# Patient Record
Sex: FEMALE | Race: WHITE
[De-identification: names, ages, dates, MRNs, and addresses within clinical notes are randomized per-mention and may not be internally consistent; named-entity substitution may affect disease eponyms.]

---

## 2019-08-23 ENCOUNTER — HOSPITAL ENCOUNTER (EMERGENCY)
Dept: HOSPITAL 47 - EC | Age: 24
Discharge: HOME | End: 2019-08-23
Payer: COMMERCIAL

## 2019-08-23 VITALS
RESPIRATION RATE: 18 BRPM | SYSTOLIC BLOOD PRESSURE: 117 MMHG | HEART RATE: 75 BPM | DIASTOLIC BLOOD PRESSURE: 73 MMHG | TEMPERATURE: 97.9 F

## 2019-08-23 DIAGNOSIS — R74.0: ICD-10-CM

## 2019-08-23 DIAGNOSIS — B34.9: Primary | ICD-10-CM

## 2019-08-23 LAB
ALBUMIN SERPL-MCNC: 5.4 G/DL (ref 3.5–5)
ALP SERPL-CCNC: 50 U/L (ref 38–126)
ALT SERPL-CCNC: 22 U/L (ref 9–52)
ANION GAP SERPL CALC-SCNC: 11 MMOL/L
AST SERPL-CCNC: 61 U/L (ref 14–36)
BASOPHILS # BLD AUTO: 0.1 K/UL (ref 0–0.2)
BASOPHILS NFR BLD AUTO: 1 %
BUN SERPL-SCNC: 10 MG/DL (ref 7–17)
CALCIUM SPEC-MCNC: 9.8 MG/DL (ref 8.4–10.2)
CHLORIDE SERPL-SCNC: 101 MMOL/L (ref 98–107)
CO2 SERPL-SCNC: 25 MMOL/L (ref 22–30)
EOSINOPHIL # BLD AUTO: 0.3 K/UL (ref 0–0.7)
EOSINOPHIL NFR BLD AUTO: 3 %
ERYTHROCYTE [DISTWIDTH] IN BLOOD BY AUTOMATED COUNT: 5.49 M/UL (ref 3.8–5.4)
ERYTHROCYTE [DISTWIDTH] IN BLOOD: 13.8 % (ref 11.5–15.5)
GLUCOSE SERPL-MCNC: 82 MG/DL (ref 74–99)
HCT VFR BLD AUTO: 50 % (ref 34–46)
HGB BLD-MCNC: 16.2 GM/DL (ref 11.4–16)
LYMPHOCYTES # SPEC AUTO: 1.5 K/UL (ref 1–4.8)
LYMPHOCYTES NFR SPEC AUTO: 12 %
MCH RBC QN AUTO: 29.6 PG (ref 25–35)
MCHC RBC AUTO-ENTMCNC: 32.5 G/DL (ref 31–37)
MCV RBC AUTO: 91.1 FL (ref 80–100)
MONOCYTES # BLD AUTO: 0.4 K/UL (ref 0–1)
MONOCYTES NFR BLD AUTO: 3 %
NEUTROPHILS # BLD AUTO: 10.5 K/UL (ref 1.3–7.7)
NEUTROPHILS NFR BLD AUTO: 81 %
PLATELET # BLD AUTO: 243 K/UL (ref 150–450)
PROT SERPL-MCNC: 9.2 G/DL (ref 6.3–8.2)
SODIUM SERPL-SCNC: 137 MMOL/L (ref 137–145)
WBC # BLD AUTO: 12.9 K/UL (ref 3.8–10.6)

## 2019-08-23 PROCEDURE — 85025 COMPLETE CBC W/AUTO DIFF WBC: CPT

## 2019-08-23 PROCEDURE — 99283 EMERGENCY DEPT VISIT LOW MDM: CPT

## 2019-08-23 PROCEDURE — 86308 HETEROPHILE ANTIBODY SCREEN: CPT

## 2019-08-23 PROCEDURE — 36415 COLL VENOUS BLD VENIPUNCTURE: CPT

## 2019-08-23 PROCEDURE — 87430 STREP A AG IA: CPT

## 2019-08-23 PROCEDURE — 87081 CULTURE SCREEN ONLY: CPT

## 2019-08-23 PROCEDURE — 96361 HYDRATE IV INFUSION ADD-ON: CPT

## 2019-08-23 PROCEDURE — 80053 COMPREHEN METABOLIC PANEL: CPT

## 2019-08-23 PROCEDURE — 96374 THER/PROPH/DIAG INJ IV PUSH: CPT

## 2019-08-23 NOTE — ED
ENT HPI





- General


Chief complaint: ENT


Stated complaint: Joint Pain


Source: patient


Mode of arrival: ambulatory


Limitations: no limitations





- History of Present Illness


Initial comments: 


Patient is a previously healthy 25yo who presents to the ER today for evaluation

of generalized body aches, sore throat and malaise. Patient reports that 

yesterday during the day she began having body aches, she reports generalized 

aching of her entire body. This evening she reports again feeling febrile, her 

throat was sore she looked in the mirror noted that her tonsils seem to be 

enlarged she also felt that her neck seemed stiff.  





Review of Systems


ROS Statement: 


Those systems with pertinent positive or pertinent negative responses have been 

documented in the HPI.





ROS Other: All systems not noted in ROS Statement are negative.





Past Medical History


Additional Past Medical History / Comment(s): HEart murmur


Past Surgical History: No Surgical Hx Reported


Past Psychological History: Depression


Smoking Status: Never smoker


Past Alcohol Use History: None Reported


Past Drug Use History: None Reported





General Exam





- General Exam Comments


Initial Comments: 


Physical Exam


GENERAL:


Patient is well-developed and well-nourished.  


Appears mildly dehydrated





HENT:


Normocephalic, Atraumatic. 


TMs normal bilaterally


Tonsillar hypertrophy, some white spots on the posterior oropharynx


Full range of motion of the neck, no nuchal rigidity no meningeal signs





EYES:


PERRL, EOMI





PULMONARY:


Unlabored respirations.  No audible rales rhonchi or wheezing was noted.





CARDIOVASCULAR:


There is a regular rate and rhythm without any murmurs gallops or rubs.  





ABDOMEN:


Soft and nontender with normal bowel sounds. 





SKIN:


Skin is clear with no lesions or rashes and otherwise unremarkable.





: 


Deferred





NEUROLOGIC:


Patient is alert and oriented x3.  Moving all extremities spontaneously





MUSCULOSKELETAL:


Normal extremities with adequate strength and full range of motion.  No lower 

extremity swelling or edema.  No calf tenderness.  





PSYCHIATRIC:


Normal psychiatric evaluation.





Limitations: no limitations





Course


                                   Vital Signs











  08/23/19 08/23/19





  02:21 04:10


 


Temperature 98.5 F 97.9 F


 


Pulse Rate 80 75


 


Respiratory 16 18





Rate  


 


Blood Pressure 130/86 117/73


 


O2 Sat by Pulse 98 98





Oximetry  














Medical Decision Making





- Medical Decision Making


The patient was seen and evaluated history is obtained from patient


History and physical exam are concerning for viral syndrome, IV fluids and 

Toradol were ordered


Strep swab was obtained





An heterophile were negative however do have a high suspicion the patient may 

have mono.  I discussed with the patient the possibility that she may have mono 

and that the heterophile test would not be positive for the first week or 2 

after exposure.  Patient's breast understanding of this.  Importance of cristina

pportive care was discussed.  Patient reported complete resolution of her body 

aches after IV fluids and Toradol and is comfortable with plan for discharge 

home.  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 

immunocompromised patients and would like to day work note to avoid exposing her

patient's to any illness.  No was provided.  All questions pertaining care were 

answered return parameters were discussed patient was discharged home in stable 

condition.








- Lab Data


Result diagrams: 


                                 08/23/19 03:26





                                 08/23/19 03:26


                                   Lab Results











  08/23/19 08/23/19 08/23/19 Range/Units





  03:26 03:26 03:26 


 


WBC  12.9 H    (3.8-10.6)  k/uL


 


RBC  5.49 H    (3.80-5.40)  m/uL


 


Hgb  16.2 H    (11.4-16.0)  gm/dL


 


Hct  50.0 H    (34.0-46.0)  %


 


MCV  91.1    (80.0-100.0)  fL


 


MCH  29.6    (25.0-35.0)  pg


 


MCHC  32.5    (31.0-37.0)  g/dL


 


RDW  13.8    (11.5-15.5)  %


 


Plt Count  243    (150-450)  k/uL


 


Neutrophils %  81    %


 


Lymphocytes %  12    %


 


Monocytes %  3    %


 


Eosinophils %  3    %


 


Basophils %  1    %


 


Neutrophils #  10.5 H    (1.3-7.7)  k/uL


 


Lymphocytes #  1.5    (1.0-4.8)  k/uL


 


Monocytes #  0.4    (0-1.0)  k/uL


 


Eosinophils #  0.3    (0-0.7)  k/uL


 


Basophils #  0.1    (0-0.2)  k/uL


 


Sodium   137   (137-145)  mmol/L


 


Potassium      (3.5-5.1)  mmol/L


 


Chloride   101   ()  mmol/L


 


Carbon Dioxide   25   (22-30)  mmol/L


 


Anion Gap   11   mmol/L


 


BUN   10   (7-17)  mg/dL


 


Creatinine   0.70   (0.52-1.04)  mg/dL


 


Est GFR (CKD-EPI)AfAm   >90   (>60 ml/min/1.73 sqM)  


 


Est GFR (CKD-EPI)NonAf   >90   (>60 ml/min/1.73 sqM)  


 


Glucose   82   (74-99)  mg/dL


 


Calcium   9.8   (8.4-10.2)  mg/dL


 


Total Bilirubin   2.0 H   (0.2-1.3)  mg/dL


 


AST   61 H   (14-36)  U/L


 


ALT   22   (9-52)  U/L


 


Alkaline Phosphatase   50   ()  U/L


 


Total Protein   9.2 H   (6.3-8.2)  g/dL


 


Albumin   5.4 H   (3.5-5.0)  g/dL


 


Heterophile Antibody    Negative  (Negative)  


 


Group A Strep Rapid     (Negative)  














  08/23/19 Range/Units





  03:26 


 


WBC   (3.8-10.6)  k/uL


 


RBC   (3.80-5.40)  m/uL


 


Hgb   (11.4-16.0)  gm/dL


 


Hct   (34.0-46.0)  %


 


MCV   (80.0-100.0)  fL


 


MCH   (25.0-35.0)  pg


 


MCHC   (31.0-37.0)  g/dL


 


RDW   (11.5-15.5)  %


 


Plt Count   (150-450)  k/uL


 


Neutrophils %   %


 


Lymphocytes %   %


 


Monocytes %   %


 


Eosinophils %   %


 


Basophils %   %


 


Neutrophils #   (1.3-7.7)  k/uL


 


Lymphocytes #   (1.0-4.8)  k/uL


 


Monocytes #   (0-1.0)  k/uL


 


Eosinophils #   (0-0.7)  k/uL


 


Basophils #   (0-0.2)  k/uL


 


Sodium   (137-145)  mmol/L


 


Potassium   (3.5-5.1)  mmol/L


 


Chloride   ()  mmol/L


 


Carbon Dioxide   (22-30)  mmol/L


 


Anion Gap   mmol/L


 


BUN   (7-17)  mg/dL


 


Creatinine   (0.52-1.04)  mg/dL


 


Est GFR (CKD-EPI)AfAm   (>60 ml/min/1.73 sqM)  


 


Est GFR (CKD-EPI)NonAf   (>60 ml/min/1.73 sqM)  


 


Glucose   (74-99)  mg/dL


 


Calcium   (8.4-10.2)  mg/dL


 


Total Bilirubin   (0.2-1.3)  mg/dL


 


AST   (14-36)  U/L


 


ALT   (9-52)  U/L


 


Alkaline Phosphatase   ()  U/L


 


Total Protein   (6.3-8.2)  g/dL


 


Albumin   (3.5-5.0)  g/dL


 


Heterophile Antibody   (Negative)  


 


Group A Strep Rapid  Negative  (Negative)  














Disposition


Clinical Impression: 


 Viral syndrome, Elevated transaminase level, Leukocytosis





Disposition: HOME SELF-CARE


Condition: Stable


Instructions (If sedation given, give patient instructions):  Mononucleosis (ED)


Is patient prescribed a controlled substance at d/c from ED?: No


Referrals: 


Felicita Parmar MD [Primary Care Provider] - 1-2 days

## 2022-04-18 ENCOUNTER — HOSPITAL ENCOUNTER (OUTPATIENT)
Dept: HOSPITAL 47 - ORWHC2ENDO | Age: 27
Discharge: HOME | End: 2022-04-18
Attending: SURGERY
Payer: COMMERCIAL

## 2022-04-18 VITALS — RESPIRATION RATE: 16 BRPM | DIASTOLIC BLOOD PRESSURE: 76 MMHG | SYSTOLIC BLOOD PRESSURE: 117 MMHG | HEART RATE: 61 BPM

## 2022-04-18 VITALS — TEMPERATURE: 98.1 F

## 2022-04-18 VITALS — BODY MASS INDEX: 21.6 KG/M2

## 2022-04-18 DIAGNOSIS — K62.5: Primary | ICD-10-CM

## 2022-04-18 DIAGNOSIS — Z79.3: ICD-10-CM

## 2022-04-18 DIAGNOSIS — E78.5: ICD-10-CM

## 2022-04-18 DIAGNOSIS — Z79.899: ICD-10-CM

## 2022-04-18 DIAGNOSIS — Z98.890: ICD-10-CM

## 2022-04-18 DIAGNOSIS — F32.A: ICD-10-CM

## 2022-04-18 PROCEDURE — 45378 DIAGNOSTIC COLONOSCOPY: CPT

## 2022-04-18 PROCEDURE — 81025 URINE PREGNANCY TEST: CPT

## 2022-04-18 NOTE — P.OP
Date of Procedure: 04/18/22


Preoperative Diagnosis: 


GI bleed


Postoperative Diagnosis: 


normal colon


Procedure(s) Performed: 


colonoscopy


Anesthesia: MAC


Surgeon: Nic Medina


Pathology: none sent


Condition: stable


Disposition: PACU


Description of Procedure: 


the patient's placed on the endoscopy table in the lateral position.  She r

eceived IV sedation.  Digital rectal exam was performed.  This revealed no 

abnormalities.  The flexible colonoscope was then placed patient anus passed 

throughout the entire colon.  The ileocecal valve was vnot visualized secondary 

to tortuosity valve.  Several times made to maneuver the scope into the cecum 

however this impossible.  Scope withdrawn.  The ascending colon, transverse 

colon and descending colon and sigmoid colon appeared normal.  Scope was brought

back the rectum and this was normal.  Scope withdrawn for patient.

## 2022-04-18 NOTE — P.GSHP
History of Present Illness


H&P Date: 04/18/22


Chief Complaint: GI bleed





Gthis a 26-year-old female who presents today for colonoscopy.  She's had issues

with intermittent rectal bleeding and anal pain.





Past Medical History


Past Medical History: Hyperlipidemia, Skin Disorder


Additional Past Medical History / Comment(s): Diarrhea, blood on toilet tissue 

and pain with bowel movements on and off X2-3 yrs. Heart murmur. Hyperlipidemia 

resolved. "Get hives easily".


History of Any Multi-Drug Resistant Organisms: None Reported


Past Surgical History: No Surgical Hx Reported


Additional Past Surgical History / Comment(s): [place] teeth, ingrown toenail 

surgery multiple times.


Past Anesthesia/Blood Transfusion Reactions: Previous Problems w/ Anesthesia


Additional Past Anesthesia/Blood Transfusion Reaction / Comment(s): Mom "needs 

more than standard dose".


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


Smoking Status: Never smoker


Past Alcohol Use History: Occasional


Past Drug Use History: Marijuana


Additional Drug Use History / Comment(s): Rare Marijuana use, none in 6 months.





- Past Family History


  ** Mother


Family Medical History: No Reported History





Medications and Allergies


                                Home Medications











 Medication  Instructions  Recorded  Confirmed  Type


 


Biotin 10,000 mcg PO DAILY 04/14/22 04/14/22 History


 


Birth Control Pill 1 tab PO DAILY 04/14/22 04/18/22 History


 


Cetirizine HCl [Zyrtec] 10 mg PO DAILY 04/14/22 04/18/22 History


 


Multivit with Calcium,Iron,Min 1 each PO DAILY 04/14/22 04/14/22 History





[Women's Multivitamin]    


 


Sertraline HCl [Zoloft] 75 mg PO DAILY 04/14/22 04/18/22 History








                                    Allergies











Allergy/AdvReac Type Severity Reaction Status Date / Time


 


No Known Allergies Allergy   Verified 04/14/22 10:48














Surgical - Exam


                                   Vital Signs











Temp Pulse Resp BP Pulse Ox


 


 98.1 F   67   16   132/82   99 


 


 04/18/22 12:34  04/18/22 12:34  04/18/22 12:34  04/18/22 12:34  04/18/22 12:34














- General


well developed, well nourished, no distress





- Eyes


PERRL





- ENT


normal pinna





- Neck


no masses





- Respiratory


normal expansion





- Cardiovascular


Rhythm: regular





- Abdomen


Abdomen: soft, non tender





Assessment and Plan


Assessment: 





history of GI bleed.  We'll perform colonoscopy

## 2022-08-10 ENCOUNTER — HOSPITAL ENCOUNTER (EMERGENCY)
Dept: HOSPITAL 47 - EC | Age: 27
Discharge: HOME | End: 2022-08-10
Payer: COMMERCIAL

## 2022-08-10 VITALS
RESPIRATION RATE: 18 BRPM | TEMPERATURE: 98.7 F | SYSTOLIC BLOOD PRESSURE: 122 MMHG | HEART RATE: 88 BPM | DIASTOLIC BLOOD PRESSURE: 70 MMHG

## 2022-08-10 DIAGNOSIS — E78.5: ICD-10-CM

## 2022-08-10 DIAGNOSIS — R19.7: Primary | ICD-10-CM

## 2022-08-10 LAB
ALBUMIN SERPL-MCNC: 4.3 G/DL (ref 3.5–5)
ALP SERPL-CCNC: 49 U/L (ref 38–126)
ALT SERPL-CCNC: 19 U/L (ref 4–34)
ANION GAP SERPL CALC-SCNC: 13 MMOL/L
AST SERPL-CCNC: 29 U/L (ref 14–36)
BASOPHILS # BLD AUTO: 0 K/UL (ref 0–0.2)
BASOPHILS NFR BLD AUTO: 0 %
BUN SERPL-SCNC: 9 MG/DL (ref 7–17)
CALCIUM SPEC-MCNC: 9.6 MG/DL (ref 8.4–10.2)
CHLORIDE SERPL-SCNC: 101 MMOL/L (ref 98–107)
CO2 SERPL-SCNC: 23 MMOL/L (ref 22–30)
EOSINOPHIL # BLD AUTO: 0.1 K/UL (ref 0–0.7)
EOSINOPHIL NFR BLD AUTO: 1 %
ERYTHROCYTE [DISTWIDTH] IN BLOOD BY AUTOMATED COUNT: 4.87 M/UL (ref 3.8–5.4)
ERYTHROCYTE [DISTWIDTH] IN BLOOD: 12.7 % (ref 11.5–15.5)
GLUCOSE SERPL-MCNC: 75 MG/DL (ref 74–99)
HCT VFR BLD AUTO: 44.8 % (ref 34–46)
HGB BLD-MCNC: 14.6 GM/DL (ref 11.4–16)
LIPASE SERPL-CCNC: 69 U/L (ref 23–300)
LYMPHOCYTES # SPEC AUTO: 1.7 K/UL (ref 1–4.8)
LYMPHOCYTES NFR SPEC AUTO: 23 %
MAGNESIUM SPEC-SCNC: 1.8 MG/DL (ref 1.6–2.3)
MCH RBC QN AUTO: 29.9 PG (ref 25–35)
MCHC RBC AUTO-ENTMCNC: 32.5 G/DL (ref 31–37)
MCV RBC AUTO: 91.8 FL (ref 80–100)
MONOCYTES # BLD AUTO: 0.4 K/UL (ref 0–1)
MONOCYTES NFR BLD AUTO: 5 %
NEUTROPHILS # BLD AUTO: 5.1 K/UL (ref 1.3–7.7)
NEUTROPHILS NFR BLD AUTO: 68 %
PLATELET # BLD AUTO: 331 K/UL (ref 150–450)
POTASSIUM SERPL-SCNC: 4.1 MMOL/L (ref 3.5–5.1)
PROT SERPL-MCNC: 7 G/DL (ref 6.3–8.2)
SODIUM SERPL-SCNC: 137 MMOL/L (ref 137–145)
WBC # BLD AUTO: 7.6 K/UL (ref 3.8–10.6)

## 2022-08-10 PROCEDURE — 83630 LACTOFERRIN FECAL (QUAL): CPT

## 2022-08-10 PROCEDURE — 87046 STOOL CULTR AEROBIC BACT EA: CPT

## 2022-08-10 PROCEDURE — 36415 COLL VENOUS BLD VENIPUNCTURE: CPT

## 2022-08-10 PROCEDURE — 83690 ASSAY OF LIPASE: CPT

## 2022-08-10 PROCEDURE — 99284 EMERGENCY DEPT VISIT MOD MDM: CPT

## 2022-08-10 PROCEDURE — 83735 ASSAY OF MAGNESIUM: CPT

## 2022-08-10 PROCEDURE — 85025 COMPLETE CBC W/AUTO DIFF WBC: CPT

## 2022-08-10 PROCEDURE — 96360 HYDRATION IV INFUSION INIT: CPT

## 2022-08-10 PROCEDURE — 96361 HYDRATE IV INFUSION ADD-ON: CPT

## 2022-08-10 PROCEDURE — 87045 FECES CULTURE AEROBIC BACT: CPT

## 2022-08-10 PROCEDURE — 80053 COMPREHEN METABOLIC PANEL: CPT

## 2022-08-28 NOTE — ED
Nausea/Vomiting/Diarrhea HPI





- General


Chief complaint: Nausea/Vomiting/Diarrhea


Stated complaint: diarrhea, recently out of country


Time Seen by Provider: 08/10/22 10:42


Source: patient


Mode of arrival: ambulatory


Limitations: no limitations





- History of Present Illness


Initial comments: 


Patient is a 27-year-old otherwise healthy female who presents to the emergency 

department with a chief complaint of diarrhea.  Patient states she was in 

[facility] for mission trip and returned when she started to experience diarrhea.

 Patient reports 3-5 episodes of diarrhea for the past 3 days.  Describes it as 

loose and watery.  Denies blood in the stool.  Has been taking loperamide at 

home with some relief.  Reports a BRAT diet and drinking Pedialyte..  Denies 

fever, chills, nausea, vomiting, abdominal pain.  Denies recent antibiotic use. 

Patient states during her trip she only drank purified water.








- Related Data


                                Home Medications











 Medication  Instructions  Recorded  Confirmed


 


Biotin 10,000 mcg PO DAILY 04/14/22 08/10/22


 


Cetirizine HCl [Zyrtec] 10 mg PO DAILY 04/14/22 08/10/22


 


Multivit with Calcium,Iron,Min 1 each PO DAILY 04/14/22 08/10/22





[Women's Multivitamin]   


 


Sertraline HCl [Zoloft] 75 mg PO DAILY 04/14/22 08/10/22


 


Blisovi Fe 1 tab PO DAILY 08/10/22 08/10/22








                                  Previous Rx's











 Medication  Instructions  Recorded


 


Ciprofloxacin HCl [Cipro] 750 mg PO DAILY #3 tab 08/10/22











                                    Allergies











Allergy/AdvReac Type Severity Reaction Status Date / Time


 


No Known Allergies Allergy   Verified 08/10/22 09:25














Review of Systems


ROS Statement: 


Those systems with pertinent positive or pertinent negative responses have been 

documented in the HPI.





ROS Other: All systems not noted in ROS Statement are negative.





Past Medical History


Past Medical History: Hyperlipidemia, Skin Disorder


Additional Past Medical History / Comment(s): Diarrhea, blood on toilet tissue 

and pain with bowel movements on and off X2-3 yrs. Heart murmur. Hyperlipidemia 

resolved. "Get hives easily".


History of Any Multi-Drug Resistant Organisms: None Reported


Past Surgical History: No Surgical Hx Reported


Additional Past Surgical History / Comment(s): [place] teeth, ingrown toenail 

surgery multiple times.


Past Anesthesia/Blood Transfusion Reactions: Previous Problems w/ Anesthesia


Additional Past Anesthesia/Blood Transfusion Reaction / Comment(s): Mom "needs 

more than standard dose".


Past Psychological History: Depression


Smoking Status: Never smoker


Past Alcohol Use History: Occasional


Past Drug Use History: Marijuana





- Past Family History


  ** Mother


Family Medical History: No Reported History





General Exam


Limitations: no limitations


General appearance: alert, in no apparent distress


Head exam: Present: atraumatic, normocephalic, normal inspection


Eye exam: Present: normal appearance, PERRL, EOMI.  Absent: scleral icterus, 

conjunctival injection, periorbital swelling


Respiratory exam: Present: normal lung sounds bilaterally.  Absent: respiratory 

distress, wheezes, rales, rhonchi, stridor


Cardiovascular Exam: Present: regular rate, normal rhythm, normal heart sounds. 

 Absent: systolic murmur, diastolic murmur, rubs, gallop, clicks


GI/Abdominal exam: Present: soft, normal bowel sounds.  Absent: distended, 

tenderness, guarding, rebound, rigid


Neurological exam: Present: alert, oriented X3, CN II-XII intact


Psychiatric exam: Present: normal affect, normal mood


Skin exam: Present: warm, dry, intact, normal color.  Absent: rash





Course


                                   Vital Signs











  08/10/22 08/10/22





  09:22 15:46


 


Temperature 98.3 F 98.7 F


 


Pulse Rate 73 88


 


Respiratory 20 18





Rate  


 


Blood Pressure 124/80 122/70


 


O2 Sat by Pulse 99 100





Oximetry  














Medical Decision Making





- Medical Decision Making


This is a 27 -year-old female who presents with diarrhea after recent travel.  

Thorough history and examination were performed.  Patient is well-appearing.  

Vital stable. No abdominal pain. No fever or blood in the stool.





Laboratory studies obtained and are within normal limits.  Patient received 

large fluid bolus in the emergency department.  She was unable to give a bowel 

movement.  Patient will be discharged with outpatient prescription for stool 

culture, fecal leukocytes, and ova/parasites.  I will empirically treat patient 

for traveler's diarrhea.  Return parameters discussed.  Patient verbalizes 

understanding and is agreeable to plan.








Patient did return to the emergency department with her stool sample which was 

sent to lab. Pending results.








Dr. Pak is my attending.








- Lab Data


Result diagrams: 


                                 08/10/22 12:23





                                 08/10/22 12:23


                                   Lab Results











  08/10/22 08/10/22 Range/Units





  12:23 12:23 


 


WBC  7.6   (3.8-10.6)  k/uL


 


RBC  4.87   (3.80-5.40)  m/uL


 


Hgb  14.6   (11.4-16.0)  gm/dL


 


Hct  44.8   (34.0-46.0)  %


 


MCV  91.8   (80.0-100.0)  fL


 


MCH  29.9   (25.0-35.0)  pg


 


MCHC  32.5   (31.0-37.0)  g/dL


 


RDW  12.7   (11.5-15.5)  %


 


Plt Count  331   (150-450)  k/uL


 


MPV  7.2   


 


Neutrophils %  68   %


 


Lymphocytes %  23   %


 


Monocytes %  5   %


 


Eosinophils %  1   %


 


Basophils %  0   %


 


Neutrophils #  5.1   (1.3-7.7)  k/uL


 


Lymphocytes #  1.7   (1.0-4.8)  k/uL


 


Monocytes #  0.4   (0-1.0)  k/uL


 


Eosinophils #  0.1   (0-0.7)  k/uL


 


Basophils #  0.0   (0-0.2)  k/uL


 


Sodium   137  (137-145)  mmol/L


 


Potassium   4.1  (3.5-5.1)  mmol/L


 


Chloride   101  ()  mmol/L


 


Carbon Dioxide   23  (22-30)  mmol/L


 


Anion Gap   13  mmol/L


 


BUN   9  (7-17)  mg/dL


 


Creatinine   0.86  (0.52-1.04)  mg/dL


 


Est GFR (CKD-EPI)AfAm   >90  (>60 ml/min/1.73 sqM)  


 


Est GFR (CKD-EPI)NonAf   >90  (>60 ml/min/1.73 sqM)  


 


Glucose   75  (74-99)  mg/dL


 


Calcium   9.6  (8.4-10.2)  mg/dL


 


Magnesium   1.8  (1.6-2.3)  mg/dL


 


Total Bilirubin   0.2  (0.2-1.3)  mg/dL


 


AST   29  (14-36)  U/L


 


ALT   19  (4-34)  U/L


 


Alkaline Phosphatase   49  ()  U/L


 


Total Protein   7.0  (6.3-8.2)  g/dL


 


Albumin   4.3  (3.5-5.0)  g/dL


 


Lipase   69  ()  U/L














Disposition


Clinical Impression: 


 Diarrhea, Recent foreign travel





Disposition: HOME SELF-CARE


Condition: Good


Instructions (If sedation given, give patient instructions):  Acute Diarrhea 

(ED)


Additional Instructions: 


Take stools culture prescription to any lab.  Have results sent to your primary 

care provider as listed on the prescription.  Take antibiotic as directed.  

Continue to drink Pedialyte and Gatorade.  Continue with food that is gentle on 

the stomach.  Follow-up with primary care provider in one to 2 days.  Return to 

the emergency department if you experience new, concerning, or worsening 

symptoms.


Prescriptions: 


Ciprofloxacin HCl [Cipro] 750 mg PO DAILY #3 tab


Is patient prescribed a controlled substance at d/c from ED?: No


Referrals: 


Felicita Parmar MD [Primary Care Provider] - 1-2 days


Time of Disposition: 13:49
Never